# Patient Record
Sex: FEMALE
[De-identification: names, ages, dates, MRNs, and addresses within clinical notes are randomized per-mention and may not be internally consistent; named-entity substitution may affect disease eponyms.]

---

## 2018-10-19 ENCOUNTER — RESULT REVIEW (OUTPATIENT)
Age: 23
End: 2018-10-19

## 2021-12-09 ENCOUNTER — APPOINTMENT (OUTPATIENT)
Dept: ORTHOPEDIC SURGERY | Facility: CLINIC | Age: 26
End: 2021-12-09
Payer: MEDICAID

## 2021-12-09 VITALS — BODY MASS INDEX: 38.64 KG/M2 | WEIGHT: 210 LBS | HEIGHT: 62 IN

## 2021-12-09 DIAGNOSIS — S86.819A STRAIN OF OTHER MUSCLE(S) AND TENDON(S) AT LOWER LEG LEVEL, UNSPECIFIED LEG, INITIAL ENCOUNTER: ICD-10-CM

## 2021-12-09 PROBLEM — Z00.00 ENCOUNTER FOR PREVENTIVE HEALTH EXAMINATION: Status: ACTIVE | Noted: 2021-12-09

## 2021-12-09 PROCEDURE — 99203 OFFICE O/P NEW LOW 30 MIN: CPT

## 2021-12-09 NOTE — HISTORY OF PRESENT ILLNESS
[de-identified] : Location: Right knee/calf\par Duration: 11/21/21\par Context: felt a pop and fell after doing a stunt for a dance competition\par Quality: achy\par Aggravating factors: bending, walking\par Associated symptoms: initial swelling\par Conservative treatment: heat, ice, rest\par Prior studies: MRI [facility] 12/2/21

## 2021-12-09 NOTE — PHYSICAL EXAM
[de-identified] : Right leg\par \par Constitutional: \par The patient is healthy-appearing and in no apparent distress. \par \par Gait:\par The patient ambulates with a normal gait and no significant limp.\par \par Cardiovascular System: \par The capillary refill is less than 2 seconds. \par \par Skin: \par There are no skin abnormalities.\par \par Right Leg:\par  \par Bony Palpation: \par There is no tenderness of the medial joint line. \par There is no tenderness of the lateral joint line.\par There is no tenderness of the medial femoral chondyle.\par There is no tenderness of the lateral femoral chondyle.\par There is no tenderness of the tibial tubercle.\par There is no tenderness of the superior patella.\par There is no tenderness of the inferior patella.\par There is no tenderness of the medial patellar facet.\par There is no tenderness of the lateral patellar facet.\par There is no tenderness of the tibia.\par There is no tenderness of the fibula.\par There is no tenderness of the medial malleolus.\par There is no tenderness of the lateral malleoulus.\par \par Soft Tissue Palpation: \par There is no tenderness of the medial retinaculum.\par There is no tenderness of the lateral retinaculum.\par There is no tenderness of the quadriceps tendon.\par There is no tenderness of the patella tendon.\par There is no tenderness of the ITB.\par There is no tenderness of the pes anserine.\par There is tenderness of the medial gastrocnemius.\par There is no tenderness of the lateral gastrocnemius.\par There is no tenderness of the Achilles.\par \par Active Range of Motion: \par The range of motion at the knee and ankle actively and passively is full. \par \par Strength: \par There is 5/5 hip flexion and 5/5 knee flexion and extension as well as ankle PF and DF.  \par \par Psychiatric: \par The patient demonstrates a normal mood and affect and is active and alert\par  [de-identified] : MRI right knee ( [facility] ): There is a medial gastroc tear of the muscle as well as a popliteal cyst.  \par

## 2021-12-09 NOTE — ASSESSMENT
[FreeTextEntry1] : Discussed at length with patient exam history and imaging as well as treatment options.  At this time patient elects home exercises observation and recommendation for moist heat.  Activity restrictions discussed and discussed that this may take 5-6 weeks to fully resolve.  Patient follow up as needed\par